# Patient Record
Sex: MALE | Race: WHITE | Employment: STUDENT | URBAN - METROPOLITAN AREA
[De-identification: names, ages, dates, MRNs, and addresses within clinical notes are randomized per-mention and may not be internally consistent; named-entity substitution may affect disease eponyms.]

---

## 2020-01-30 ENCOUNTER — HOSPITAL ENCOUNTER (OUTPATIENT)
Age: 20
Discharge: HOME | End: 2020-01-30
Attending: FAMILY MEDICINE
Payer: COMMERCIAL

## 2020-01-30 VITALS
HEART RATE: 86 BPM | HEIGHT: 69 IN | BODY MASS INDEX: 28.14 KG/M2 | WEIGHT: 190 LBS | RESPIRATION RATE: 18 BRPM | SYSTOLIC BLOOD PRESSURE: 129 MMHG | OXYGEN SATURATION: 98 % | TEMPERATURE: 98 F | DIASTOLIC BLOOD PRESSURE: 89 MMHG

## 2020-01-30 DIAGNOSIS — J10.1 INFLUENZA B: Primary | ICD-10-CM

## 2020-01-30 LAB
POCT INFLUENZA A: NEGATIVE
POCT INFLUENZA B: POSITIVE

## 2020-01-30 PROCEDURE — 87502 INFLUENZA DNA AMP PROBE: CPT | Performed by: FAMILY MEDICINE

## 2020-01-30 PROCEDURE — 93010 ELECTROCARDIOGRAM REPORT: CPT

## 2020-01-30 PROCEDURE — 99204 OFFICE O/P NEW MOD 45 MIN: CPT

## 2020-01-30 PROCEDURE — 93010 ELECTROCARDIOGRAM REPORT: CPT | Performed by: INTERNAL MEDICINE

## 2020-01-30 PROCEDURE — 93005 ELECTROCARDIOGRAM TRACING: CPT

## 2020-01-30 RX ORDER — OSELTAMIVIR PHOSPHATE 75 MG/1
75 CAPSULE ORAL 2 TIMES DAILY
Qty: 10 CAPSULE | Refills: 0 | Status: SHIPPED | OUTPATIENT
Start: 2020-01-30 | End: 2020-02-04

## 2020-01-31 LAB
ATRIAL RATE: 85 BPM
P AXIS: 38 DEGREES
P-R INTERVAL: 142 MS
Q-T INTERVAL: 364 MS
QRS DURATION: 114 MS
QTC CALCULATION (BEZET): 433 MS
R AXIS: 68 DEGREES
T AXIS: 46 DEGREES
VENTRICULAR RATE: 85 BPM

## 2020-01-31 NOTE — ED INITIAL ASSESSMENT (HPI)
Pt c/o yesterday on waking up had s/s of chills, sweating, increased heart rate, body aches, chest tightness, headache intermittently. Pt states he wants to make sure he doesn't have the flu and states he has 2 heart conditions that made him come in.  Branden Fry

## 2020-01-31 NOTE — ED PROVIDER NOTES
Patient Seen in: [facility]      History   Patient presents with:  Arrythmia/Palpitations    Stated Complaint: increased heart rate, fever, tight chest 2 days    HPI    40-year-old male with history of cardiomyopathy and VS external ear normal.      Left Ear: Hearing, tympanic membrane, ear canal and external ear normal.      Nose: Nose normal.      Mouth/Throat:      Pharynx: Uvula midline.    Eyes:      General: Lids are normal.      Conjunctiva/sclera: Conjunctivae normal. of 1/30/2020  6:51 PM    START taking these medications    Oseltamivir Phosphate (TAMIFLU) 75 MG Oral Cap  Take 1 capsule (75 mg total) by mouth 2 (two) times daily for 5 days. , Normal, Disp-10 capsule, R-0